# Patient Record
Sex: MALE | Race: WHITE
[De-identification: names, ages, dates, MRNs, and addresses within clinical notes are randomized per-mention and may not be internally consistent; named-entity substitution may affect disease eponyms.]

---

## 2020-11-17 ENCOUNTER — HOSPITAL ENCOUNTER (OUTPATIENT)
Dept: HOSPITAL 88 - CATH LAB | Age: 70
Discharge: HOME | End: 2020-11-17
Attending: INTERNAL MEDICINE
Payer: COMMERCIAL

## 2020-11-17 VITALS — SYSTOLIC BLOOD PRESSURE: 107 MMHG | DIASTOLIC BLOOD PRESSURE: 87 MMHG

## 2020-11-17 VITALS — SYSTOLIC BLOOD PRESSURE: 107 MMHG | DIASTOLIC BLOOD PRESSURE: 71 MMHG

## 2020-11-17 VITALS — SYSTOLIC BLOOD PRESSURE: 114 MMHG | DIASTOLIC BLOOD PRESSURE: 82 MMHG

## 2020-11-17 VITALS — DIASTOLIC BLOOD PRESSURE: 78 MMHG | SYSTOLIC BLOOD PRESSURE: 140 MMHG

## 2020-11-17 VITALS — SYSTOLIC BLOOD PRESSURE: 122 MMHG | DIASTOLIC BLOOD PRESSURE: 82 MMHG

## 2020-11-17 DIAGNOSIS — Z88.2: ICD-10-CM

## 2020-11-17 DIAGNOSIS — J44.9: ICD-10-CM

## 2020-11-17 DIAGNOSIS — N40.0: ICD-10-CM

## 2020-11-17 DIAGNOSIS — I87.2: ICD-10-CM

## 2020-11-17 DIAGNOSIS — E78.00: ICD-10-CM

## 2020-11-17 DIAGNOSIS — I35.0: ICD-10-CM

## 2020-11-17 DIAGNOSIS — I10: ICD-10-CM

## 2020-11-17 DIAGNOSIS — Z99.81: ICD-10-CM

## 2020-11-17 DIAGNOSIS — Z72.0: ICD-10-CM

## 2020-11-17 DIAGNOSIS — I25.118: Primary | ICD-10-CM

## 2020-11-17 DIAGNOSIS — G47.33: ICD-10-CM

## 2020-11-17 DIAGNOSIS — E11.9: ICD-10-CM

## 2020-11-17 DIAGNOSIS — F32.9: ICD-10-CM

## 2020-11-17 DIAGNOSIS — Z82.49: ICD-10-CM

## 2020-11-17 LAB
ALBUMIN SERPL-MCNC: 3.3 G/DL (ref 3.5–5)
ALBUMIN/GLOB SERPL: 1 {RATIO} (ref 0.8–2)
ALP SERPL-CCNC: 54 IU/L (ref 40–150)
ALT SERPL-CCNC: 14 IU/L (ref 0–55)
ANION GAP SERPL CALC-SCNC: 12.2 MMOL/L (ref 8–16)
BASOPHILS # BLD AUTO: 0.1 10*3/UL (ref 0–0.1)
BASOPHILS NFR BLD AUTO: 0.9 % (ref 0–1)
BUN SERPL-MCNC: 10 MG/DL (ref 7–26)
BUN/CREAT SERPL: 13 (ref 6–25)
CALCIUM SERPL-MCNC: 8.8 MG/DL (ref 8.4–10.2)
CHLORIDE SERPL-SCNC: 86 MMOL/L (ref 98–107)
CO2 SERPL-SCNC: 42 MMOL/L (ref 22–29)
DEPRECATED NEUTROPHILS # BLD AUTO: 7.4 10*3/UL (ref 2.1–6.9)
EGFRCR SERPLBLD CKD-EPI 2021: > 60 ML/MIN (ref 60–?)
EOSINOPHIL # BLD AUTO: 0.1 10*3/UL (ref 0–0.4)
EOSINOPHIL NFR BLD AUTO: 1.2 % (ref 0–6)
ERYTHROCYTE [DISTWIDTH] IN CORD BLOOD: 13.3 % (ref 11.7–14.4)
GLOBULIN PLAS-MCNC: 3.4 G/DL (ref 2.3–3.5)
GLUCOSE SERPLBLD-MCNC: 123 MG/DL (ref 74–118)
HCT VFR BLD AUTO: 40.8 % (ref 38.2–49.6)
HGB BLD-MCNC: 13.2 G/DL (ref 14–18)
LYMPHOCYTES # BLD: 1.1 10*3/UL (ref 1–3.2)
LYMPHOCYTES NFR BLD AUTO: 11 % (ref 18–39.1)
MCH RBC QN AUTO: 33.4 PG (ref 28–32)
MCHC RBC AUTO-ENTMCNC: 32.4 G/DL (ref 31–35)
MCV RBC AUTO: 103.3 FL (ref 81–99)
MONOCYTES # BLD AUTO: 1 10*3/UL (ref 0.2–0.8)
MONOCYTES NFR BLD AUTO: 10.5 % (ref 4.4–11.3)
NEUTS SEG NFR BLD AUTO: 75.5 % (ref 38.7–80)
PLATELET # BLD AUTO: 248 X10E3/UL (ref 140–360)
POTASSIUM SERPL-SCNC: 3.2 MMOL/L (ref 3.5–5.1)
RBC # BLD AUTO: 3.95 X10E6/UL (ref 4.3–5.7)
SODIUM SERPL-SCNC: 137 MMOL/L (ref 136–145)

## 2020-11-17 PROCEDURE — 93312 ECHO TRANSESOPHAGEAL: CPT

## 2020-11-17 PROCEDURE — 99152 MOD SED SAME PHYS/QHP 5/>YRS: CPT

## 2020-11-17 PROCEDURE — 80053 COMPREHEN METABOLIC PANEL: CPT

## 2020-11-17 PROCEDURE — 76937 US GUIDE VASCULAR ACCESS: CPT

## 2020-11-17 PROCEDURE — 93307 TTE W/O DOPPLER COMPLETE: CPT

## 2020-11-17 PROCEDURE — 85025 COMPLETE CBC W/AUTO DIFF WBC: CPT

## 2020-11-17 PROCEDURE — 93325 DOPPLER ECHO COLOR FLOW MAPG: CPT

## 2020-11-17 PROCEDURE — 36415 COLL VENOUS BLD VENIPUNCTURE: CPT

## 2020-11-17 PROCEDURE — 93320 DOPPLER ECHO COMPLETE: CPT

## 2020-11-17 PROCEDURE — 93454 CORONARY ARTERY ANGIO S&I: CPT

## 2020-11-17 NOTE — NUR
NITA W/ DR. GAMEZ



143--IN ROOM

1439--MD IN ROOM

1440--HURRICAINE SPRAY

1441--HURRICAINE SPRAY

1451--PROBE IN

1457--PROBE OUT

1502--TO [facility] HOLDING ROOM 10 FOR RECOVERY S/P NITA AND TO AWAIT [facility]

## 2020-11-17 NOTE — NUR
1645 RADIAL Compression removal:     Initial  Cuff  volume   14   cc

          



   1645p      -4 cc Removed  No hematoma/ bleeding noted with normal  neurovascular 
function.



    1700p      -5  cc Removed  No hematoma/bleeding noted with normal  neurovascular 
function.



     1715p     -5  cc Removed  No hematoma/ bleeding noted with normal  neurovascular 
function.



Air removal completed. 

Stasis achieved sterile 2x2,Tegaderm,  Coban  dressing  No hematoma, bleeding noted with 
normal neurovascular function. Pt instructed on POC.

Ds/Rn

## 2020-11-17 NOTE — NUR
1730p---------RT---TRBAND -----------

Pt meets discharge criteria. VS wnl, alert and oriented. Pt and Family Understands discharge 
instruction. Overall general assess w/o gross outliers. Skin warm, dry, and intact. Right 
radial dressing soft w/o s/s of hematoma. + neurovascular function of right hand present. IV 
removed and appears distal tip is intact, ***** staff member verifying Elio QUINTERO.  Pt 
maintains mask on for COVID 19 precautions being taken by wheel chair to awaiting car. 
Transfers w/o gross distress with discharge paperwork in hand.-ds/rn

## 2020-11-17 NOTE — OPERATIVE REPORT
DATE OF PROCEDURE:  11/17/2020

 

SURGEON:  Cyrus Corea MD

 

INDICATIONS:  

1. Coronary artery disease.

2. Aortic valve stenosis.

 

PROCEDURES PERFORMED:  

1. Ultrasound-guided access in the right radial artery with sheath placement.

2. Left heart catheterization, selective coronary angiography.

3. Conscious sedation, 35 minutes.

4. Deployment of right wrist TR band.

 

COMPLICATIONS:  None.

 

RECOMMENDATIONS:  Transfemoral aortic valve replacement.

 

DESCRIPTION OF PROCEDURE:  Access was obtained in the right radial artery using

ultrasound guidance.  A 6-[place] sheath was placed.  Coronary angiography demonstrated

mild coronary artery disease less than 20% luminal irregularities in all coronary

vessels.  No critical 

stenosis or occlusions.  Excellent flow in all vessels.  No intervention deemed

necessary.  Right wrist TR band applied.  The patient discharged home the same day. 

 

 

 

 

______________________________

Cyrus Corea MD

 

KSB/MODL

D:  11/17/2020 16:09:37

T:  11/17/2020 17:13:27

Job #:  [id number]

## 2020-11-17 NOTE — NUR
1615 pt in  rm 17 [facility] .Dr Corea NO fix Rt TR band No gross issue 
pain,pallor,pressure or dysrhythmia. Alert oriented and appropriate, PERRLA, respirations 
even and unlabored to room air. Pulses x4 extremities equal and palpable  . Cap fill brisk < 
3 sec.  Normal neurovascular function of right wrist/hand TR band air down at 1645pm,Dc at 
1730pm scheduled. Son at bedside dc papers completed



Skin warm and dry integrity appears intact in general. IV left hand  and  presents healthy 
w/o s/s of infiltration or complaint.  Abdomen soft and supple. pt offered toileting, denies 
need to urinate or defecate. Personal affects with patient.  Family to be called post 
procedure . Pt verbalizes understanding of POC. Educated on call light use. bed low and 
locked, side rails up x2 and call light at side. Pt using  personal mask for COVID-19 
mitigation.  -ds/rn

## 2022-09-19 ENCOUNTER — HOSPITAL ENCOUNTER (OUTPATIENT)
Dept: HOSPITAL 88 - LAB | Age: 72
End: 2022-09-19
Attending: INTERNAL MEDICINE
Payer: COMMERCIAL

## 2022-09-19 DIAGNOSIS — Z20.822: ICD-10-CM

## 2022-09-19 DIAGNOSIS — I50.32: Primary | ICD-10-CM

## 2022-09-19 PROCEDURE — 0223U NFCT DS 22 TRGT SARS-COV-2: CPT

## 2022-09-19 PROCEDURE — 36415 COLL VENOUS BLD VENIPUNCTURE: CPT
